# Patient Record
Sex: FEMALE | Race: ASIAN | NOT HISPANIC OR LATINO | Employment: STUDENT | URBAN - METROPOLITAN AREA
[De-identification: names, ages, dates, MRNs, and addresses within clinical notes are randomized per-mention and may not be internally consistent; named-entity substitution may affect disease eponyms.]

---
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
## 2021-05-24 ENCOUNTER — VIRTUAL VISIT (OUTPATIENT)
Dept: DERMATOLOGY | Facility: CLINIC | Age: 21
End: 2021-05-24
Payer: COMMERCIAL

## 2021-05-24 ENCOUNTER — TELEPHONE (OUTPATIENT)
Dept: DERMATOLOGY | Facility: CLINIC | Age: 21
End: 2021-05-24

## 2021-05-24 DIAGNOSIS — L20.84 INTRINSIC ATOPIC DERMATITIS: Primary | ICD-10-CM

## 2021-05-24 PROCEDURE — 99203 OFFICE O/P NEW LOW 30 MIN: CPT | Mod: GQ | Performed by: DERMATOLOGY

## 2021-05-24 RX ORDER — HYDROCORTISONE 25 MG/G
OINTMENT TOPICAL 2 TIMES DAILY
Qty: 30 G | Refills: 3 | Status: SHIPPED | OUTPATIENT
Start: 2021-05-24

## 2021-05-24 RX ORDER — CETIRIZINE HYDROCHLORIDE 10 MG/1
10 TABLET ORAL DAILY
COMMUNITY

## 2021-05-24 RX ORDER — TRIAMCINOLONE ACETONIDE 1 MG/G
CREAM TOPICAL 2 TIMES DAILY
Qty: 80 G | Refills: 3 | Status: SHIPPED | OUTPATIENT
Start: 2021-05-24 | End: 2024-04-30

## 2021-05-24 ASSESSMENT — PAIN SCALES - GENERAL: PAINLEVEL: NO PAIN (0)

## 2021-05-24 NOTE — LETTER
5/24/2021       RE: Pankaj Ahn  [address]  [place] [address]     Dear Colleague,    Thank you for referring your patient, Pankaj Ahn, to the [facility] [place] at [facility]. Please see a copy of my visit note below.    [facility] Dermatology Note  Encounter Date: May 24, 2021  Store-and-Forward and Telephone ([phone]). Location of teledermatologist: [facility] [place].  Start time: 10:27. End time: 10:34.    Dermatology Problem List:  1. Eczematous dermatitis: neck, eyelids, antecubital fossa  - hydrocortisone 2.5% ointment for face, triamcinolone cream elsewhere  - consider patch testing if refractory    ____________________________________________    Assessment & Plan:     1. Eczematous dermatitis: on eyelids, posterior neck near hairline, antecubital fossa. Will start topicals and would consider patch testing if refractory, given distribution.  - hydrocortisone 2.5% ointment BID for eyelids  - triamcinolone 0.1% cream for neck, AC fossa    Procedures Performed:    None    Follow-up: as needed    Staff:     Brett Reeves MD   of Dermatology  Department of Dermatology  [facility]    ____________________________________________    CC: Eczema (Pankaj, is here for her Eczema, around her neck arm, and eye lids )    HPI:  Ms. Pankaj Ahn is a(n) 21 year old female who presents today as a new patient for eczema    Eczema on back of neck, eyelids, elbows - red, itchy  - has used triamcinolone cream in the past - seemed to work    Patient is otherwise feeling well, without additional skin concerns.    Labs Reviewed:  N/A    Physical Exam:  Vitals: There were no vitals taken for this visit.  SKIN: Teledermatology photos were reviewed; image quality and interpretability: acceptable. Image date: 5/24/21.  - erythematous  scaly patches on the medial upper eyelids, posterior neck at hairline, and antecubital fossa  - No other lesions of concern on areas examined.     Medications:  Current Outpatient Medications   Medication     cetirizine (ZYRTEC) 10 MG tablet     No current facility-administered medications for this visit.       Past Medical/Surgical History:   There is no problem list on file for this patient.    No past medical history on file.    CC Referred Self, MD  No address on file on close of this encounter.                              Pictures were placed in Pt's chart today for future reference.  PHOENIX [place]

## 2021-05-24 NOTE — NURSING NOTE
Chief Complaint   Patient presents with     Eczema     Pankaj, is here for her Eczema, around her neck arm, and eye lids     Daniel Carrera EMT

## 2021-05-24 NOTE — PROGRESS NOTES
Pictures were placed in Pt's chart today for future reference.  PHOENIX [place]

## 2021-05-24 NOTE — TELEPHONE ENCOUNTER
[facility] Call Center    Phone Message    May a detailed message be left on voicemail: no     Reason for Call: Other: `      Pt attempting to create MyC acct. MyC helpteam not able to assist as chart is locked (it is locked by Vin Carrera).  No reply at clinic backline at time of Pt call.    Pt emailing photos and expecting calls from clinic to check in and from     Action Taken: Message routed to:  [facility] Derm    Travel Screening: Not Applicable

## 2021-05-24 NOTE — PROGRESS NOTES
[facility] Dermatology Note  Encounter Date: May 24, 2021  Store-and-Forward and Telephone ([phone]). Location of teledermatologist: [facility] [place].  Start time: 10:27. End time: 10:34.    Dermatology Problem List:  1. Eczematous dermatitis: neck, eyelids, antecubital fossa  - hydrocortisone 2.5% ointment for face, triamcinolone cream elsewhere  - consider patch testing if refractory    ____________________________________________    Assessment & Plan:     1. Eczematous dermatitis: on eyelids, posterior neck near hairline, antecubital fossa. Will start topicals and would consider patch testing if refractory, given distribution.  - hydrocortisone 2.5% ointment BID for eyelids  - triamcinolone 0.1% cream for neck, AC fossa    Procedures Performed:    None    Follow-up: as needed    Staff:     Brett Reeves MD   of Dermatology  Department of Dermatology  [facility]    ____________________________________________    CC: Eczema (Pankaj, is here for her Eczema, around her neck arm, and eye lids )    HPI:  Ms. Pankaj Ahn is a(n) 21 year old female who presents today as a new patient for eczema    Eczema on back of neck, eyelids, elbows - red, itchy  - has used triamcinolone cream in the past - seemed to work    Patient is otherwise feeling well, without additional skin concerns.    Labs Reviewed:  N/A    Physical Exam:  Vitals: There were no vitals taken for this visit.  SKIN: Teledermatology photos were reviewed; image quality and interpretability: acceptable. Image date: 5/24/21.  - erythematous scaly patches on the medial upper eyelids, posterior neck at hairline, and antecubital fossa  - No other lesions of concern on areas examined.     Medications:  Current Outpatient Medications   Medication     cetirizine (ZYRTEC) 10 MG tablet     No current facility-administered medications for this visit.        Past Medical/Surgical History:   There is no problem list on file for this patient.    No past medical history on file.    CC Referred Self, MD  No address on file on close of this encounter.

## 2021-06-20 ENCOUNTER — HEALTH MAINTENANCE LETTER (OUTPATIENT)
Age: 21
End: 2021-06-20

## 2021-10-10 ENCOUNTER — HEALTH MAINTENANCE LETTER (OUTPATIENT)
Age: 21
End: 2021-10-10

## 2022-05-13 ENCOUNTER — OFFICE VISIT (OUTPATIENT)
Dept: DERMATOLOGY | Facility: CLINIC | Age: 22
End: 2022-05-13
Payer: COMMERCIAL

## 2022-05-13 DIAGNOSIS — R21 FACIAL RASH: Primary | ICD-10-CM

## 2022-05-13 PROCEDURE — 99214 OFFICE O/P EST MOD 30 MIN: CPT | Performed by: DERMATOLOGY

## 2022-05-13 RX ORDER — TRIAMCINOLONE ACETONIDE 0.25 MG/G
OINTMENT TOPICAL 2 TIMES DAILY
Qty: 80 G | Refills: 1 | Status: SHIPPED | OUTPATIENT
Start: 2022-05-13 | End: 2022-05-13

## 2022-05-13 RX ORDER — TRIAMCINOLONE ACETONIDE 0.25 MG/G
CREAM TOPICAL 2 TIMES DAILY
Qty: 80 G | Refills: 1 | Status: SHIPPED | OUTPATIENT
Start: 2022-05-13

## 2022-05-13 ASSESSMENT — PAIN SCALES - GENERAL: PAINLEVEL: NO PAIN (0)

## 2022-05-13 NOTE — NURSING NOTE
Dermatology Rooming Note    Pankaj Ahn's goals for this visit include:   Chief Complaint   Patient presents with     Derm Problem     Small bumps on the face that wont go away, has had for a few months      Ana Turner CMA on 5/13/2022 at 10:33 AM

## 2022-05-13 NOTE — PATIENT INSTRUCTIONS
Maybe eczema??  Continue cerave  Try the topical steroid twice daily as needed - hydrocortisone 2.5% ointment   Message me if getting much worse   Consider patch testing for skin allergy

## 2022-05-13 NOTE — PROGRESS NOTES
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 Currently working at the [employer].  ____________________________________________    Assessment & Plan:     # Facial rash - a bit unusual in presentation. Given h/o eczema in youth and h/o eczematous derm flare in 5/2021, overall favor eczematous dermatitis. Ddx atopic dermatitis v ACD. Ddx includes acne which she does have intermixed as well but primary lesion seems to not be aceniform. Less likely flat warts but also considered these as well. Plan to retrial topical steroids and monitor closely. Consider bx if not improving and possibly patch testing down the line.  - Start hydrocortisone 2.5% ointment BID prn (she has at home)  - If runs out of hydrocortisone 2.5%, can fill TMC 0.025% cream  - Moisturize: Recommend good OTC moisturizer to full body, such as Cera-Ve, Marifer-cream, or Cetaphil. Advised best to apply after bathing to lock in moisture.  - Photos obtained today.  - Future considerations: patch testing and/or biopsy    Procedures Performed:   None    Follow-up: 4-6 week(s) in-person, or earlier for new or changing lesions    Staff and Scribe:     Scribe Disclosure:  I, Joseline Combs, am serving as a scribe to document services personally performed by Tete Villela MD based on data collection and the provider's statements to me.     Provider Disclosure:   The documentation recorded by the scribe accurately reflects the services I personally performed and the decisions made by me.    Tete Villela MD    Department of Dermatology  [facility]  [facility]: Phone: [phone], Fax: [phone]  5/16/2022     ____________________________________________    CC: Derm Problem (Small bumps on the face that wont go away, has had for a few months )    HPI:  Ms. Pankaj Ahn is a(n) 22 year old female who presents today as a return patient for a derm problem. The patient was last seen in dermatology by Dr. Leandro zamudio on 05/24/2021 at which point patient started on hydrocortisone 2.5% ointment BID on eyelids and triamcinolone 0.1% cream on neck for eczematous dermatitis.    Today, the patient reports that they started a few months ago and did not go away, they are mostly on her cheeks and jaw line which was new to her. She noticed that it is smaller in the morning and more prominent as the day goes on. She has tried different face washes, face products and tried to wash her pillow case with no resolve. She reported that they feel dry. Denies any rashes or outbreaks. Of note, reports having eczema when she was younger. She did have them on her cheeks but not on her jaw line.    Patient is otherwise feeling well, without additional skin concerns.    Labs Reviewed:  N/A    Physical Exam:  Vitals: There were no vitals taken for this visit.  SKIN: Focused examination of the face was performed.  - Forehead, cheeks, lower chin, subtle flesh colored papules with admixed acneiform papules.  - No other lesions of concern on areas examined.     Medications:  Current Outpatient Medications   Medication     cetirizine (ZYRTEC) 10 MG tablet     hydrocortisone 2.5 % ointment     triamcinolone (KENALOG) 0.1 % external cream     No current facility-administered medications for this visit.      Past Medical History:   There is no problem list on file for this patient.    No past medical history on file.     CC Referred Self, MD  No address on file on close of this encounter.

## 2022-05-13 NOTE — LETTER
5/13/2022       RE: Pankaj Ahn  [address]  [place] [address]     Dear Colleague,    Thank you for referring your patient, Pankaj Ahn, to the [facility] [place] at [facility]. Please see a copy of my visit note below.    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 Currently working at the [employer].  ____________________________________________    Assessment & Plan:     # Facial rash - a bit unusual in presentation. Given h/o eczema in youth and h/o eczematous derm flare in 5/2021, overall favor eczematous dermatitis. Ddx atopic dermatitis v ACD. Ddx includes acne which she does have intermixed as well but primary lesion seems to not be aceniform. Less likely flat warts but also considered these as well. Plan to retrial topical steroids and monitor closely. Consider bx if not improving and possibly patch testing down the line.  - Start hydrocortisone 2.5% ointment BID prn (she has at home)  - If runs out of hydrocortisone 2.5%, can fill TMC 0.025% cream  - Moisturize: Recommend good OTC moisturizer to full body, such as Cera-Ve, Marifer-cream, or Cetaphil. Advised best to apply after bathing to lock in moisture.  - Photos obtained today.  - Future considerations: patch testing and/or biopsy    Procedures Performed:   None    Follow-up: 4-6 week(s) in-person, or earlier for new or changing lesions    Staff and Scribe:     Scribe Disclosure:  Joseline RAMSAY, am serving as a scribe to document services personally performed by Tete Villela MD based on data collection  and the provider's statements to me.     Provider Disclosure:   The documentation recorded by the scribe accurately reflects the services I personally performed and the decisions made by me.    Tete Villela MD    Department of Dermatology  [facility]: Phone: [phone], Fax: [phone]  5/16/2022     ____________________________________________    CC: Derm Problem (Small bumps on the face that wont go away, has had for a few months )    HPI:  Ms. Pankaj Ahn is a(n) 22 year old female who presents today as a return patient for a derm problem. The patient was last seen in dermatology by Dr. Leandro zamudio on 05/24/2021 at which point patient started on hydrocortisone 2.5% ointment BID on eyelids and triamcinolone 0.1% cream on neck for eczematous dermatitis.    Today, the patient reports that they started a few months ago and did not go away, they are mostly on her cheeks and jaw line which was new to her. She noticed that it is smaller in the morning and more prominent as the day goes on. She has tried different face washes, face products and tried to wash her pillow case with no resolve. She reported that they feel dry. Denies any rashes or outbreaks. Of note, reports having eczema when she was younger. She did have them on her cheeks but not on her jaw line.    Patient is otherwise feeling well, without additional skin concerns.    Labs Reviewed:  N/A    Physical Exam:  Vitals: There were no vitals taken for this visit.  SKIN: Focused examination of the face was performed.  - Forehead, cheeks, lower chin, subtle flesh colored papules with admixed acneiform papules.  - No other lesions of concern on areas examined.     Medications:  Current Outpatient Medications   Medication     cetirizine (ZYRTEC) 10 MG tablet     hydrocortisone 2.5 % ointment     triamcinolone (KENALOG) 0.1 % external cream     No  current facility-administered medications for this visit.      Past Medical History:   There is no problem list on file for this patient.    No past medical history on file.     CC Referred Self, MD  No address on file on close of this encounter.

## 2022-06-14 ENCOUNTER — OFFICE VISIT (OUTPATIENT)
Dept: DERMATOLOGY | Facility: CLINIC | Age: 22
End: 2022-06-14
Payer: COMMERCIAL

## 2022-06-14 DIAGNOSIS — L70.0 ACNE VULGARIS: Primary | ICD-10-CM

## 2022-06-14 PROCEDURE — 99214 OFFICE O/P EST MOD 30 MIN: CPT | Performed by: DERMATOLOGY

## 2022-06-14 RX ORDER — CLINDAMYCIN PHOSPHATE 10 UG/ML
LOTION TOPICAL DAILY
Qty: 60 ML | Refills: 11 | Status: SHIPPED | OUTPATIENT
Start: 2022-06-14 | End: 2023-06-20

## 2022-06-14 RX ORDER — TRETINOIN 0.25 MG/G
CREAM TOPICAL
Qty: 45 G | Refills: 11 | Status: SHIPPED | OUTPATIENT
Start: 2022-06-14 | End: 2023-06-20

## 2022-06-14 ASSESSMENT — PAIN SCALES - GENERAL: PAINLEVEL: NO PAIN (0)

## 2022-06-14 NOTE — NURSING NOTE
Dermatology Rooming Note    Pankaj Ahn's goals for this visit include:   Chief Complaint   Patient presents with     Derm Problem     Pt is here for follow up on facial rash, states that its been about the same.      Vera Mills, Visit Facilitator

## 2022-06-14 NOTE — PATIENT INSTRUCTIONS
For your acne:  - Start tretinoin 0.025% cream at bedtime. Apply pea-sized amount to face. This can be drying so please start every other night, then increase to nightly as tolerated after 1-2 weeks.  - Start clindamycin lotion daily in AM.  - Start benzoyl peroxide 4-5% wash daily in shower. This can be purchased over the counter at [employer] or [employer] ([employer] makes this product). It can be found in a purple tube in the acne aisle. Be sure to rinse thoroughly with use as it can bleach towels and clothing.    It may get worse before it gets better.    Return in 3 months, sooner if concerns.

## 2022-06-14 NOTE — PROGRESS NOTES
[facility] Dermatology Note  Encounter Date: Jun 14, 2022  Office Visit     Dermatology Problem List:  1. 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 Currently working at the [employer].    ____________________________________________    Assessment & Plan:    # Acne vulgaris  Last visit had concern for eczematous dermatitis, but today it is more comedonal, so will treat for acne.. If not improved, consider biopsy.  - Stop hydrocortisone 2.5% ointment BID prn  - Start OTC BPO wash  - Start tretinoin 0.025% cream every other night increasing to nightly as tolerated  - Start clindamycin 1% lotion daily in AM  - Future considerations: patch testing and/or biopsy    Procedures Performed:   None    Follow-up: 3 month(s) in-person, or earlier for new or changing lesions    Staff and Scribe:     Scribe Disclosure:  I, Yunier Alas, am serving as a scribe to document services personally performed by Tete Villela MD based on data collection and the provider's statements to me.     Provider Disclosure:   The documentation recorded by the scribe accurately reflects the services I personally performed and the decisions made by me.    Tete Villela MD    Department of Dermatology  [facility]: Phone: [phone], Fax: [phone]  6/14/2022     ____________________________________________    CC: Derm Problem (Pt is here for follow up on facial rash, states that its been about the same. )    HPI:  Ms. Pankaj Ahn is a(n) 22 year old female who presents today as a return patient for facial rash follow-up. Last seen by me on 5/13/2022, at which time patient was  started on hydrocortisone 2.5% ointment BID prn for treatment of facial rash.    Today, patient reports no change in facial rash since last visit. She has been using hydrocortisone 2.5% nightly.    Patient is otherwise feeling well, without additional skin concerns.    Labs Reviewed:  N/A    Physical Exam:  Vitals: There were no vitals taken for this visit.  SKIN: Focused examination of face was performed.  - Forehead, lateral cheeks, periorbital skin, and cheeks with scattered closed comedones and rare blackheads.  - No other lesions of concern on areas examined.     Medications:  Current Outpatient Medications   Medication     cetirizine (ZYRTEC) 10 MG tablet     triamcinolone (KENALOG) 0.025 % cream     hydrocortisone 2.5 % ointment     triamcinolone (KENALOG) 0.1 % external cream     No current facility-administered medications for this visit.      Past Medical History:   There is no problem list on file for this patient.    No past medical history on file.     CC Tete Villela MD   DERMATOLOGY  [address] [place], [place] [id number] [phone] on close of this encounter.

## 2022-06-14 NOTE — LETTER
6/14/2022       RE: Pankaj Ahn  [address]  [facility] [address]     Dear Colleague,    Thank you for referring your patient, Pankaj Ahn, to the [facility] DERMATOLOGY CLINIC [place] at [facility]. Please see a copy of my visit note below.    [facility] Dermatology Note  Encounter Date: Jun 14, 2022  Office Visit     Dermatology Problem List:  1. 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 Currently working at the [employer].    ____________________________________________    Assessment & Plan:    # Acne vulgaris  Last visit had concern for eczematous dermatitis, but today it is more comedonal, so will treat for acne.. If not improved, consider biopsy.  - Stop hydrocortisone 2.5% ointment BID prn  - Start OTC BPO wash  - Start tretinoin 0.025% cream every other night increasing to nightly as tolerated  - Start clindamycin 1% lotion daily in AM  - Future considerations: patch testing and/or biopsy    Procedures Performed:   None    Follow-up: 3 month(s) in-person, or earlier for new or changing lesions    Staff and Scribe:     Scribe Disclosure:  I, Yunier Alas, am serving as a scribe to document services personally performed by Tete Villela MD based on data collection and the provider's statements to me.     Provider Disclosure:   The documentation recorded by the scribe accurately reflects the services I personally performed and the decisions made by me.    Tete Villela MD    Department of Dermatology  [facility]: Phone: [phone], Fax: [phone]  6/14/2022      ____________________________________________    CC: Derm Problem (Pt is here for follow up on facial rash, states that its been about the same. )    HPI:  Ms. Pankaj Ahn is a(n) 22 year old female who presents today as a return patient for facial rash follow-up. Last seen by me on 5/13/2022, at which time patient was started on hydrocortisone 2.5% ointment BID prn for treatment of facial rash.    Today, patient reports no change in facial rash since last visit. She has been using hydrocortisone 2.5% nightly.    Patient is otherwise feeling well, without additional skin concerns.    Labs Reviewed:  N/A    Physical Exam:  Vitals: There were no vitals taken for this visit.  SKIN: Focused examination of face was performed.  - Forehead, lateral cheeks, periorbital skin, and cheeks with scattered closed comedones and rare blackheads.  - No other lesions of concern on areas examined.     Medications:  Current Outpatient Medications   Medication     cetirizine (ZYRTEC) 10 MG tablet     triamcinolone (KENALOG) 0.025 % cream     hydrocortisone 2.5 % ointment     triamcinolone (KENALOG) 0.1 % external cream     No current facility-administered medications for this visit.      Past Medical History:   There is no problem list on file for this patient.    No past medical history on file.     CC Tete Villela MD   DERMATOLOGY  [address] [place], [place] [id number], [place] [address] on close of this encounter.

## 2022-07-13 ENCOUNTER — TELEPHONE (OUTPATIENT)
Dept: DERMATOLOGY | Facility: CLINIC | Age: 22
End: 2022-07-13

## 2022-07-13 NOTE — TELEPHONE ENCOUNTER
Call and reach patient to schedule a 3 month follow up in the Dermatology Clinic per checkout comment dispositions with Dr Villela last visit on 06-. Patient is informed that provider will be on leave and offer another provider. Patient decline. Will check with clinic and call patient back if possible to be seen in September.

## 2022-07-14 ENCOUNTER — TELEPHONE (OUTPATIENT)
Dept: DERMATOLOGY | Facility: CLINIC | Age: 22
End: 2022-07-14

## 2022-07-14 NOTE — TELEPHONE ENCOUNTER
----- Message from Tete Villela MD sent at 7/13/2022  7:59 PM CDT -----  Regarding: RE: Sept f/up  Please use SAKSHI    ----- Message -----  From: Johanna Briones  Sent: 7/13/2022   4:46 PM CDT  To: Tete Villela MD, #  Subject: Sept f/up                                        Hi,     Patient to return for a f/up in Sept from last visit 6/14/22 for a 3 months check. No openings. Patient is informed that provider will be on maternity leave and offer with another provider. Patient decline and only want to see Dr Villela. Please advise for add on.     Thanks     Johanna   Clinic Coordinator        
Call patient to help schedule follow up with Dr Villela. Provider okay to use a SAKSHI. Left message to call back to schedule.  
DISPLAY PLAN FREE TEXT

## 2022-07-16 ENCOUNTER — HEALTH MAINTENANCE LETTER (OUTPATIENT)
Age: 22
End: 2022-07-16

## 2022-07-21 ENCOUNTER — MYC MEDICAL ADVICE (OUTPATIENT)
Dept: DERMATOLOGY | Facility: CLINIC | Age: 22
End: 2022-07-21

## 2022-07-21 DIAGNOSIS — L70.0 ACNE VULGARIS: ICD-10-CM

## 2022-07-21 NOTE — TELEPHONE ENCOUNTER
Patient transferred to Dermatology phone line from [facility]. Patient scheduled on 9/6/2022 @8:15am with Dr. Villela. Patient acknowledged. States she had no other questions or concerns.     Alton Anderson, EMT

## 2022-09-06 ENCOUNTER — OFFICE VISIT (OUTPATIENT)
Dept: DERMATOLOGY | Facility: CLINIC | Age: 22
End: 2022-09-06
Payer: COMMERCIAL

## 2022-09-06 DIAGNOSIS — L20.84 INTRINSIC ECZEMA: ICD-10-CM

## 2022-09-06 DIAGNOSIS — L70.0 ACNE VULGARIS: Primary | ICD-10-CM

## 2022-09-06 PROCEDURE — 99214 OFFICE O/P EST MOD 30 MIN: CPT | Performed by: DERMATOLOGY

## 2022-09-06 RX ORDER — TACROLIMUS 1 MG/G
OINTMENT TOPICAL 2 TIMES DAILY
Qty: 60 G | Refills: 11 | Status: SHIPPED | OUTPATIENT
Start: 2022-09-06

## 2022-09-06 RX ORDER — LEVONORGESTREL AND ETHINYL ESTRADIOL 0.15-0.03
1 KIT ORAL DAILY
COMMUNITY

## 2022-09-06 ASSESSMENT — PAIN SCALES - GENERAL: PAINLEVEL: NO PAIN (0)

## 2022-09-06 NOTE — LETTER
9/6/2022       RE: Pankaj Ahn  [address] [place] [address]     Dear Colleague,    Thank you for referring your patient, Pankaj Ahn, to the [facility] DERMATOLOGY CLINIC [place] at [facility]. Please see a copy of my visit note below.    [facility] Dermatology Note  Encounter Date: Sep 6, 2022  Office Visit     Dermatology Problem List:  1. Acne vulgaris  - Current rx: tretinoin 0.025%, clindamycin lotion, BPO    2. 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 Currently working at the [employer].    ____________________________________________    Assessment & Plan:    # Acne vulgaris - chronic, stable.  Has improved on current regimen. Some dryness but tolerable.   - Continue with OTC BPO wash, tretinoin 0.025% cream, clindamycin 1% lotion qAM  - Notify us if would like adapalene over the winter over [place] and please send if requested    #Eczematous dermatitis - chronic, stable.  - Start Protopic BID prn for facial involvement as topical steroids could precipitate acne; additionally safer long-term  - Continue with triamcinolone 0.1% and 0.025% cream BID prn for flares      Procedures Performed:   None     Follow-up: 1 year(s) in-person, or earlier for new or changing lesions. Please provide any refills while requested while I am out on maternity leave.    Staff, Scribe, and Medical Student:     Scribe Disclosure:  I, Mendy Gale, am serving as a scribe to document services personally performed by Tete Villela MD based on data collection and the provider's statements to me.     Jen Harris, MS3     Staff Physician:  I was present with the medical student who participated in the service and in the documentation of the note. I have verified the history and personally  performed the physical exam and medical decision making. I agree with the assessment and plan of care as documented in the note.       Tete Villela MD    Department of Dermatology  [facility]: Phone: [phone], Fax: [phone]  9/7/2022    ____________________________________________    CC: Derm Problem (Pankaj is here for a rash follow-up and states it has improved since last seen.)    HPI:  Ms. Pankaj Ahn is a(n) 22 year old female who presents today as a return patient for acne vulgaris. Last seen in dermatology on 6/14/2022 by me, at which time patient was stopped on hydrocortisone 2.5% ointment and started on OTC BPO wash, tretinoin 0.025% cream nightly as tolerated, and clindamycin 1% lotion daily for treatment of acne vulgaris.     Today, she reports that her skin has improved. She has been consistently using the BPO wash, clindamycin, and tretinoin. She reports that the tretinoin can be drying but she is able to manage this with moisturizer. She doesn't notice any correlation with her menstrual cycle. She notices spot specifically along her jaw and lateral cheeks.     She reports having eczema that affects her eyelids/necks. She uses triamcinolone every 2-3 weeks for flares and notes that it is well controlled with triamcinolone.      Patient is otherwise feeling well, without additional skin concerns.    Labs Reviewed:  N/A    Physical Exam:  Vitals: There were no vitals taken for this visit.  SKIN: Focused examination of face was performed.  - Minimal closed comedones, no pustules on face. Pinhead follicular papules on upper cheek.   - No other lesions of concern on areas examined.     Medications:  Current Outpatient Medications   Medication     benzoyl peroxide 5 % external liquid     cetirizine (ZYRTEC) 10 MG tablet     clindamycin (CLEOCIN T) 1 % external lotion     levonorgestrel-ethinyl  estradiol (NORDETTE) 0.15-30 MG-MCG tablet     tacrolimus (PROTOPIC) 0.1 % external ointment     tretinoin (RETIN-A) 0.025 % external cream     triamcinolone (KENALOG) 0.025 % cream     hydrocortisone 2.5 % ointment     triamcinolone (KENALOG) 0.1 % external cream     No current facility-administered medications for this visit.      Past Medical History:   There is no problem list on file for this patient.    No past medical history on file.     CC Tete Villela MD   DERMATOLOGY  [address] [place], [place] [id number], [place] [address] on close of this encounter.

## 2022-09-06 NOTE — NURSING NOTE
Dermatology Rooming Note    Pankaj Ahn's goals for this visit include:   Chief Complaint   Patient presents with     Derm Problem     Pankaj is here for a rash follow-up and states it has improved since last seen.     Alton Anderson, EMT

## 2022-09-06 NOTE — PROGRESS NOTES
[facility] Dermatology Note  Encounter Date: Sep 6, 2022  Office Visit     Dermatology Problem List:  1. Acne vulgaris  - Current rx: tretinoin 0.025%, clindamycin lotion, BPO    2. 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 Currently working at the [employer].    ____________________________________________    Assessment & Plan:    # Acne vulgaris - chronic, stable.  Has improved on current regimen. Some dryness but tolerable.   - Continue with OTC BPO wash, tretinoin 0.025% cream, clindamycin 1% lotion qAM  - Notify us if would like adapalene over the winter over my[place] and please send if requested    #Eczematous dermatitis - chronic, stable.  - Start Protopic BID prn for facial involvement as topical steroids could precipitate acne; additionally safer long-term  - Continue with triamcinolone 0.1% and 0.025% cream BID prn for flares      Procedures Performed:   None     Follow-up: 1 year(s) in-person, or earlier for new or changing lesions. Please provide any refills while requested while I am out on maternity leave.    Staff, Scribe, and Medical Student:     Scribe Disclosure:  I, Mendy Gale, am serving as a scribe to document services personally performed by Tete Villela MD based on data collection and the provider's statements to me.     Jen Harris, MS3     Staff Physician:  I was present with the medical student who participated in the service and in the documentation of the note. I have verified the history and personally performed the physical exam and medical decision making. I agree with the assessment and plan of care as documented in the note.       Tete Villela MD    Department of Dermatology  [facility]: Phone: [phone], Fax:  [phone]  9/7/2022    ____________________________________________    CC: Derm Problem (Pankaj is here for a rash follow-up and states it has improved since last seen.)    HPI:  Ms. Pankaj Ahn is a(n) 22 year old female who presents today as a return patient for acne vulgaris. Last seen in dermatology on 6/14/2022 by me, at which time patient was stopped on hydrocortisone 2.5% ointment and started on OTC BPO wash, tretinoin 0.025% cream nightly as tolerated, and clindamycin 1% lotion daily for treatment of acne vulgaris.     Today, she reports that her skin has improved. She has been consistently using the BPO wash, clindamycin, and tretinoin. She reports that the tretinoin can be drying but she is able to manage this with moisturizer. She doesn't notice any correlation with her menstrual cycle. She notices spot specifically along her jaw and lateral cheeks.     She reports having eczema that affects her eyelids/necks. She uses triamcinolone every 2-3 weeks for flares and notes that it is well controlled with triamcinolone.      Patient is otherwise feeling well, without additional skin concerns.    Labs Reviewed:  N/A    Physical Exam:  Vitals: There were no vitals taken for this visit.  SKIN: Focused examination of face was performed.  - Minimal closed comedones, no pustules on face. Pinhead follicular papules on upper cheek.   - No other lesions of concern on areas examined.     Medications:  Current Outpatient Medications   Medication     benzoyl peroxide 5 % external liquid     cetirizine (ZYRTEC) 10 MG tablet     clindamycin (CLEOCIN T) 1 % external lotion     levonorgestrel-ethinyl estradiol (NORDETTE) 0.15-30 MG-MCG tablet     tacrolimus (PROTOPIC) 0.1 % external ointment     tretinoin (RETIN-A) 0.025 % external cream     triamcinolone (KENALOG) 0.025 % cream     hydrocortisone 2.5 % ointment     triamcinolone (KENALOG) 0.1 % external cream     No current facility-administered medications for this  visit.      Past Medical History:   There is no problem list on file for this patient.    No past medical history on file.     CC Tete Villela MD   DERMATOLOGY  [address] [place], [place] [id number] [phone] on close of this encounter.

## 2022-09-06 NOTE — PATIENT INSTRUCTIONS
"Consider using adapalene in winter months if tretinoin 0.025% becomes too irritating or drying. Can also do \"tretinoin sandwich\" by applying moisturizer, then tretinoin, then more moisturizer.     Continue with current regimen of BPO wash, tretinoin, and clindamycin lotion    Can start using tacrolimus ointment on problem areas. This can be slightly irritating at first but should improve. If it is persistently irritating, discontinue use.     Plan to follow up in one year.       "

## 2022-09-10 ENCOUNTER — PATIENT OUTREACH (OUTPATIENT)
Dept: DERMATOLOGY | Facility: CLINIC | Age: 22
End: 2022-09-10

## 2022-09-10 NOTE — TELEPHONE ENCOUNTER
Attempted to reach patient to schedule follow up in the Dermatology Clinic.  No answer,  LM on VM to call office and [employer] message sent..    Schedule with Dr. Tete Villela -9/2023.

## 2022-09-18 ENCOUNTER — HEALTH MAINTENANCE LETTER (OUTPATIENT)
Age: 22
End: 2022-09-18

## 2022-10-27 ENCOUNTER — TELEPHONE (OUTPATIENT)
Dept: DERMATOLOGY | Facility: CLINIC | Age: 22
End: 2022-10-27

## 2022-10-27 ENCOUNTER — TRANSCRIBE ORDERS (OUTPATIENT)
Dept: DERMATOLOGY | Facility: CLINIC | Age: 22
End: 2022-10-27

## 2022-10-27 NOTE — TELEPHONE ENCOUNTER
Lvm sent letter informing Patient her 9-6-23 appointment with  was cancelled. Provider not available.

## 2023-04-17 ENCOUNTER — TELEPHONE (OUTPATIENT)
Dept: DERMATOLOGY | Facility: CLINIC | Age: 23
End: 2023-04-17
Payer: COMMERCIAL

## 2023-04-17 NOTE — TELEPHONE ENCOUNTER
2 attempts lvm my chart msg for patient to call to rescheduled 9-1-23 appointment with  in Derm. [phone]

## 2023-06-15 DIAGNOSIS — L70.0 ACNE VULGARIS: ICD-10-CM

## 2023-06-20 RX ORDER — TRETINOIN 0.25 MG/G
CREAM TOPICAL
Qty: 45 G | Refills: 2 | Status: SHIPPED | OUTPATIENT
Start: 2023-06-20

## 2023-06-20 RX ORDER — CLINDAMYCIN PHOSPHATE 10 UG/ML
LOTION TOPICAL DAILY
Qty: 60 ML | Refills: 2 | Status: SHIPPED | OUTPATIENT
Start: 2023-06-20

## 2023-07-30 ENCOUNTER — HEALTH MAINTENANCE LETTER (OUTPATIENT)
Age: 23
End: 2023-07-30

## 2023-09-05 ENCOUNTER — MYC MEDICAL ADVICE (OUTPATIENT)
Dept: DERMATOLOGY | Facility: CLINIC | Age: 23
End: 2023-09-05
Payer: COMMERCIAL

## 2024-03-22 ENCOUNTER — OFFICE VISIT (OUTPATIENT)
Dept: DERMATOLOGY | Facility: CLINIC | Age: 24
End: 2024-03-22
Payer: COMMERCIAL

## 2024-03-22 DIAGNOSIS — L70.0 ACNE VULGARIS: ICD-10-CM

## 2024-03-22 DIAGNOSIS — L20.84 INTRINSIC ECZEMA: ICD-10-CM

## 2024-03-22 DIAGNOSIS — D23.9 SYRINGOMA: Primary | ICD-10-CM

## 2024-03-22 PROCEDURE — 99214 OFFICE O/P EST MOD 30 MIN: CPT | Performed by: DERMATOLOGY

## 2024-03-22 ASSESSMENT — PAIN SCALES - GENERAL: PAINLEVEL: NO PAIN (0)

## 2024-03-22 NOTE — NURSING NOTE
Chief Complaint   Patient presents with    Skin Check     The patient reports bumps underneath their eyes. The patient reports occasional dryness and irritation at the affected areas. The patient reports use of hydrocortisone with minimal improvement.      Татьяна Collier LPN

## 2024-03-22 NOTE — PROGRESS NOTES
[facility] Dermatology Note  Encounter Date: Mar 22, 2024  Office Visit     Dermatology Problem List:  1. Acne vulgaris  - Current rx: tretinoin 0.025%, clindamycin lotion, BPO  2. Eczematous dermatitis   -Current rx: Triamcinolone 0.1% cream and TMC 0.025% cream; Protopic  3. 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 Currently working at the [employer].    ____________________________________________    Assessment & Plan:    # Syringomas. Natural history and etiology discussed. Discussed limitations in treatment. Could consider cosmetic derm referral, not interested today.  - Photo obtained today.    # Acne vulgaris - chronic, stable. Doing well. Can message for refills when needed.  - Continue with OTC BPO wash, tretinoin 0.025% cream, clindamycin 1% lotion qAM     #Eczematous dermatitis - chronic, stable. Doing well. Can messaged for refills.  - Continue Protopic BID prn.  - Continue with triamcinolone 0.1% and 0.025% cream BID prn for flares    Procedures Performed:   None.    Follow-up: 1 year(s) in-person, or earlier for new or changing lesions    Staff and Scribe:     Scribe Disclosure:   I, PANKAJ HURTADO, am serving as a scribe; to document services personally performed by Tete Villela MD -based on data collection and the provider's statements to me.    Provider Disclosure:   The documentation recorded by the scribe accurately reflects the services I personally performed and the decisions made by me.    Tete Villela MD    Department of Dermatology  [facility]: Phone: [phone], Fax: [phone]  3/26/2024     ____________________________________________    CC: Skin Check (The patient reports bumps underneath their eyes. The patient reports occasional dryness and irritation at the affected areas. The patient reports use of  hydrocortisone with minimal improvement. )    HPI:  Ms. Pankaj Ahn is a(n) 23 year old female who presents today as a return patient for FBSE.    The patient has bumps under her eyes that she would like to be checked. Has been present for a couple of years, but in the last few months has become more dry and irritated. She has tried applying hydrocortisone, not much improvement. She questions if there is any kind of topical she could apply to help.     Patient is otherwise feeling well, without additional skin concerns.    Labs Reviewed:  N/A    Physical Exam:  Vitals: There were no vitals taken for this visit.  SKIN: exam of face performed  - Eyelids and infraorbital cheeks with multiple small flesh colored papules.  - No other lesions of concern on areas examined.     Medications:  Current Outpatient Medications   Medication    benzoyl peroxide 5 % external liquid    cetirizine (ZYRTEC) 10 MG tablet    clindamycin (CLEOCIN T) 1 % external lotion    hydrocortisone 2.5 % ointment    levonorgestrel-ethinyl estradiol (NORDETTE) 0.15-30 MG-MCG tablet    tacrolimus (PROTOPIC) 0.1 % external ointment    tretinoin (RETIN-A) 0.025 % external cream    triamcinolone (KENALOG) 0.025 % cream    triamcinolone (KENALOG) 0.1 % external cream     No current facility-administered medications for this visit.      Past Medical History:   There is no problem list on file for this patient.    No past medical history on file.     CC Referred Self, MD  No address on file on close of this encounter.

## 2024-03-22 NOTE — LETTER
3/22/2024       RE: Pankaj Ahn  [address] [place] [address]     Dear Colleague,    Thank you for referring your patient, Pankaj Ahn, to the [facility] DERMATOLOGY CLINIC [place] at [facility]. Please see a copy of my visit note below.    [facility] Dermatology Note  Encounter Date: Mar 22, 2024  Office Visit     Dermatology Problem List:  1. Acne vulgaris  - Current rx: tretinoin 0.025%, clindamycin lotion, BPO  2. Eczematous dermatitis   -Current rx: Triamcinolone 0.1% cream and TMC 0.025% cream; Protopic  3. 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 Currently working at the [employer].    ____________________________________________    Assessment & Plan:    # Syringomas. Natural history and etiology discussed. Discussed limitations in treatment. Could consider cosmetic derm referral, not interested today.  - Photo obtained today.    # Acne vulgaris - chronic, stable. Doing well. Can message for refills when needed.  - Continue with OTC BPO wash, tretinoin 0.025% cream, clindamycin 1% lotion qAM     #Eczematous dermatitis - chronic, stable. Doing well. Can messaged for refills.  - Continue Protopic BID prn.  - Continue with triamcinolone 0.1% and 0.025% cream BID prn for flares    Procedures Performed:   None.    Follow-up: 1 year(s) in-person, or earlier for new or changing lesions    Staff and Scribe:     Scribe Disclosure:   I, PANKAJ HURTADO, am serving as a scribe; to document services personally performed by Tete Villela MD -based on data collection and the provider's statements to me.    Provider Disclosure:   The documentation recorded by the scribe accurately reflects the services I personally performed and the decisions made by me.    Tete Villela MD    Department of Dermatology  [facility]: Phone: [phone], Fax: [phone]  3/26/2024     ____________________________________________    CC: Skin Check (The patient reports bumps underneath their eyes. The patient reports occasional dryness and irritation at the affected areas. The patient reports use of hydrocortisone with minimal improvement. )    HPI:  Ms. Pankaj Ahn is a(n) 23 year old female who presents today as a return patient for FBSE.    The patient has bumps under her eyes that she would like to be checked. Has been present for a couple of years, but in the last few months has become more dry and irritated. She has tried applying hydrocortisone, not much improvement. She questions if there is any kind of topical she could apply to help.     Patient is otherwise feeling well, without additional skin concerns.    Labs Reviewed:  N/A    Physical Exam:  Vitals: There were no vitals taken for this visit.  SKIN: exam of face performed  - Eyelids and infraorbital cheeks with multiple small flesh colored papules.  - No other lesions of concern on areas examined.     Medications:  Current Outpatient Medications   Medication    benzoyl peroxide 5 % external liquid    cetirizine (ZYRTEC) 10 MG tablet    clindamycin (CLEOCIN T) 1 % external lotion    hydrocortisone 2.5 % ointment    levonorgestrel-ethinyl estradiol (NORDETTE) 0.15-30 MG-MCG tablet    tacrolimus (PROTOPIC) 0.1 % external ointment    tretinoin (RETIN-A) 0.025 % external cream    triamcinolone (KENALOG) 0.025 % cream    triamcinolone (KENALOG) 0.1 % external cream     No current facility-administered medications for this visit.      Past Medical History:   There is no problem list on file for this patient.    No past medical history on file.     CC Referred Self, MD  No address on file on close of this encounter.

## 2024-04-30 ENCOUNTER — MYC REFILL (OUTPATIENT)
Dept: DERMATOLOGY | Facility: CLINIC | Age: 24
End: 2024-04-30
Payer: COMMERCIAL

## 2024-04-30 DIAGNOSIS — L20.84 INTRINSIC ATOPIC DERMATITIS: ICD-10-CM

## 2024-04-30 RX ORDER — TRIAMCINOLONE ACETONIDE 1 MG/G
CREAM TOPICAL 2 TIMES DAILY
Qty: 80 G | Refills: 3 | Status: SHIPPED | OUTPATIENT
Start: 2024-04-30

## 2024-04-30 NOTE — TELEPHONE ENCOUNTER
[facility] Dermatology Note  Encounter Date: Mar 22, 2024  Office Visit      Dermatology Problem List:  1. Acne vulgaris  - Current rx: tretinoin 0.025%, clindamycin lotion, BPO  2. Eczematous dermatitis   -Current rx: Triamcinolone 0.1% cream and TMC 0.025% cream; Protopic  3. 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 Currently working at the [employer].     ____________________________________________     Assessment & Plan:     # Syringomas. Natural history and etiology discussed. Discussed limitations in treatment. Could consider cosmetic derm referral, not interested today.  - Photo obtained today.     # Acne vulgaris - chronic, stable. Doing well. Can message for refills when needed.  - Continue with OTC BPO wash, tretinoin 0.025% cream, clindamycin 1% lotion qAM     #Eczematous dermatitis - chronic, stable. Doing well. Can messaged for refills.  - Continue Protopic BID prn.  - Continue with triamcinolone 0.1% and 0.025% cream BID prn for flares

## 2024-05-05 ENCOUNTER — HEALTH MAINTENANCE LETTER (OUTPATIENT)
Age: 24
End: 2024-05-05

## 2025-01-15 ENCOUNTER — MYC REFILL (OUTPATIENT)
Dept: DERMATOLOGY | Facility: CLINIC | Age: 25
End: 2025-01-15
Payer: COMMERCIAL

## 2025-01-15 DIAGNOSIS — L70.0 ACNE VULGARIS: ICD-10-CM

## 2025-01-16 RX ORDER — TRETINOIN 0.25 MG/G
CREAM TOPICAL
Qty: 45 G | Refills: 2 | Status: SHIPPED | OUTPATIENT
Start: 2025-01-16

## 2025-01-16 NOTE — TELEPHONE ENCOUNTER
Derm-Allergy Refill Protocol  Process #1    -Refill qty to 12 months from last clinic visit   -Refill with or without scheduled appointment, Refuse if over 12 months.    -No need to contact Clinic Coordinators about appointment unless over 12 mo.    tretinoin (RETIN-A) 0.025 % external cream 45 g 2 6/20/2023     Last Office Visit : 3/22/24  Future Office visit:  3/26/25